# Patient Record
Sex: MALE | Race: WHITE
[De-identification: names, ages, dates, MRNs, and addresses within clinical notes are randomized per-mention and may not be internally consistent; named-entity substitution may affect disease eponyms.]

---

## 2019-04-11 LAB
ALBUMIN SERPL-MCNC: 3.7 G/DL (ref 3.5–5)
ALBUMIN/GLOB SERPL: 1.3 {RATIO} (ref 0.8–2)
ALP SERPL-CCNC: 76 IU/L (ref 40–150)
ALT SERPL-CCNC: 10 IU/L (ref 0–55)
ANION GAP SERPL CALC-SCNC: 9.2 MMOL/L (ref 8–16)
BASOPHILS # BLD AUTO: 0 10*3/UL (ref 0–0.1)
BASOPHILS NFR BLD AUTO: 0.4 % (ref 0–1)
BUN SERPL-MCNC: 15 MG/DL (ref 7–26)
BUN/CREAT SERPL: 19 (ref 6–25)
CALCIUM SERPL-MCNC: 9.2 MG/DL (ref 8.4–10.2)
CHLORIDE SERPL-SCNC: 107 MMOL/L (ref 98–107)
CO2 SERPL-SCNC: 27 MMOL/L (ref 22–29)
DEPRECATED NEUTROPHILS # BLD AUTO: 3.3 10*3/UL (ref 2.1–6.9)
EGFRCR SERPLBLD CKD-EPI 2021: > 60 ML/MIN (ref 60–?)
EOSINOPHIL # BLD AUTO: 0.2 10*3/UL (ref 0–0.4)
EOSINOPHIL NFR BLD AUTO: 2.8 % (ref 0–6)
ERYTHROCYTE [DISTWIDTH] IN CORD BLOOD: 11.3 % (ref 11.7–14.4)
GLOBULIN PLAS-MCNC: 2.9 G/DL (ref 2.3–3.5)
GLUCOSE SERPLBLD-MCNC: 85 MG/DL (ref 74–118)
HCT VFR BLD AUTO: 39.5 % (ref 38.2–49.6)
HGB BLD-MCNC: 13.6 G/DL (ref 14–18)
LYMPHOCYTES # BLD: 2.7 10*3/UL (ref 1–3.2)
LYMPHOCYTES NFR BLD AUTO: 39.7 % (ref 18–39.1)
MCH RBC QN AUTO: 31.9 PG (ref 28–32)
MCHC RBC AUTO-ENTMCNC: 34.4 G/DL (ref 31–35)
MCV RBC AUTO: 92.7 FL (ref 81–99)
MONOCYTES # BLD AUTO: 0.6 10*3/UL (ref 0.2–0.8)
MONOCYTES NFR BLD AUTO: 8.1 % (ref 4.4–11.3)
NEUTS SEG NFR BLD AUTO: 48.9 % (ref 38.7–80)
PLATELET # BLD AUTO: 177 X10E3/UL (ref 140–360)
POTASSIUM SERPL-SCNC: 4.2 MMOL/L (ref 3.5–5.1)
RBC # BLD AUTO: 4.26 X10E6/UL (ref 4.3–5.7)
SODIUM SERPL-SCNC: 139 MMOL/L (ref 136–145)

## 2019-04-12 ENCOUNTER — HOSPITAL ENCOUNTER (OUTPATIENT)
Dept: HOSPITAL 88 - CATH LAB | Age: 39
Discharge: HOME | End: 2019-04-12
Attending: INTERNAL MEDICINE
Payer: COMMERCIAL

## 2019-04-12 VITALS — HEIGHT: 75 IN | WEIGHT: 230 LBS | BODY MASS INDEX: 28.6 KG/M2

## 2019-04-12 VITALS — DIASTOLIC BLOOD PRESSURE: 87 MMHG | SYSTOLIC BLOOD PRESSURE: 108 MMHG

## 2019-04-12 VITALS — DIASTOLIC BLOOD PRESSURE: 63 MMHG | SYSTOLIC BLOOD PRESSURE: 112 MMHG

## 2019-04-12 VITALS — SYSTOLIC BLOOD PRESSURE: 117 MMHG | DIASTOLIC BLOOD PRESSURE: 80 MMHG

## 2019-04-12 VITALS — SYSTOLIC BLOOD PRESSURE: 102 MMHG | DIASTOLIC BLOOD PRESSURE: 63 MMHG

## 2019-04-12 VITALS — DIASTOLIC BLOOD PRESSURE: 69 MMHG | SYSTOLIC BLOOD PRESSURE: 112 MMHG

## 2019-04-12 VITALS — SYSTOLIC BLOOD PRESSURE: 108 MMHG | DIASTOLIC BLOOD PRESSURE: 65 MMHG

## 2019-04-12 VITALS — DIASTOLIC BLOOD PRESSURE: 90 MMHG | SYSTOLIC BLOOD PRESSURE: 117 MMHG

## 2019-04-12 DIAGNOSIS — Z79.82: ICD-10-CM

## 2019-04-12 DIAGNOSIS — I48.91: ICD-10-CM

## 2019-04-12 DIAGNOSIS — I25.10: Primary | ICD-10-CM

## 2019-04-12 DIAGNOSIS — Z01.812: ICD-10-CM

## 2019-04-12 PROCEDURE — 85025 COMPLETE CBC W/AUTO DIFF WBC: CPT

## 2019-04-12 PROCEDURE — 93458 L HRT ARTERY/VENTRICLE ANGIO: CPT

## 2019-04-12 PROCEDURE — 36415 COLL VENOUS BLD VENIPUNCTURE: CPT

## 2019-04-12 PROCEDURE — 80053 COMPREHEN METABOLIC PANEL: CPT

## 2019-04-12 NOTE — NUR
0930 Titration 12cc balloon. to rt tr band site. -2cc Positive vol 11cc Positive rt radial 
pulse.

0945 -2cc Positive ,volume 8cc. Positive rt radial pulse.

1000 -2cc Positive,volume 6cc . Positive rt radial pulse.

1015 -2cc Positive,volume 3cc . Positive rt radial pulse.

1030 -2cc Positive,volume no volume in balloon sterile 2x2 in place with Tegaderm, Coban 
with splint to right wrist. No gross signs of pain or pallor.

ok for dc

ds/rn

## 2019-04-12 NOTE — NUR
0840AM Received in #9 [facility] pot Dr Chris Report received fromBriseida CRONIN,No fix . Back to 
baseline orientation. Respiration shallow and regular 98% on room air. Abdomen soft and non 
tender. Denies necessity to defecate or urinate. Rt TR band has 12cc air. NO gross signs 
pain pallor,pressure,pain or dysthymia. Left hand iv infusing at 100cchr 0.9 NS. No signs of 
infiltration Family spoke with MD Reviewed POC with MD. Aware of importance to f/o 2wks. 
Copies of POC with family. Bilateral PP x4 PD DP. TR band Titration ok in 60minutes. 
Tolerated po intake. Void qs. 

ds/rn

## 2019-04-12 NOTE — NUR
1030 TR band titration completed ready for dc. No bleeding or hematoma. Iv removed NO s/s 
infiltration, Coban dressing to iv and with splint and Coban dressing to  rt TR band site. 
No gross signs pain,pallor,pressure or dysrhythmia. Has copies of plan of care. Aware of 
importance of f/o care.To private car per [facility] employee.  Denies c/o CP or SOB aware of 
importance of f/o  care.

ds/rn

## 2019-04-12 NOTE — OPERATIVE REPORT
DATE OF PROCEDURE:  04/12/2019

 

SURGEON:  Ralph Chris MD

 

CARDIAC CATH LAB PROCEDURE NOTE

 

INDICATION:  Coronary artery disease and abnormal stress test.

 

PROCEDURES PERFORMED:  

1. Left heart catheterization, selective coronary angiography, left ventriculography.

2. Deployment of right wrist TR band.

 

COMPLICATIONS:  None.

 

RECOMMENDATIONS:  Medical therapy.

 

DESCRIPTION OF PROCEDURE:  Access was obtained in the right radial artery.  A 5-Italian

sheath was placed.  Diagnostic coronary angiogram revealed minimal coronary artery

disease of 10% to 20% luminal stenosis in all coronary vessels.  No critical stenosis or

occlusions were noted.  No intervention was deemed necessary.  LV ejection fraction of

40% with mild global hypokinesis and mildly dilated left ventricle.  LV end-diastolic

pressure of 8.  No gradient across the aortic valve pullback. 

 

Right wrist sheath and catheter were removed.  TR band applied.  The patient was

discharged home on the same day. 

 

 

 

 

______________________________

Ralph Chris MD

 

KSB/MODL

D:  04/12/2019 08:44:36

T:  04/12/2019 15:36:03

Job #:  [id number]